# Patient Record
Sex: MALE | Race: WHITE
[De-identification: names, ages, dates, MRNs, and addresses within clinical notes are randomized per-mention and may not be internally consistent; named-entity substitution may affect disease eponyms.]

---

## 2018-06-19 ENCOUNTER — HOSPITAL ENCOUNTER (EMERGENCY)
Dept: HOSPITAL 63 - ER | Age: 23
Discharge: HOME | End: 2018-06-19
Payer: COMMERCIAL

## 2018-06-19 VITALS — DIASTOLIC BLOOD PRESSURE: 86 MMHG | SYSTOLIC BLOOD PRESSURE: 145 MMHG

## 2018-06-19 VITALS — BODY MASS INDEX: 25.11 KG/M2 | HEIGHT: 67 IN | WEIGHT: 160 LBS

## 2018-06-19 DIAGNOSIS — Z72.0: ICD-10-CM

## 2018-06-19 DIAGNOSIS — R04.2: Primary | ICD-10-CM

## 2018-06-19 PROCEDURE — 99284 EMERGENCY DEPT VISIT MOD MDM: CPT

## 2018-06-19 PROCEDURE — 71046 X-RAY EXAM CHEST 2 VIEWS: CPT

## 2018-06-19 NOTE — PHYS DOC
Past History


Past Medical History:  No Pertinent History


Past Surgical History:  No Surgical History


Alcohol Use:  Occasionally


Drug Use:  Marijuana





Adult General


Chief Complaint


Chief Complaint:  COUGH





HPI


HPI





Patient is a 23-year-old male who presents for evaluation after he noticed that 

he has been coughing up a small blood. He does smoke cigarettes. He denies any 

other complaints and specifically denies shortness of breath, chest pain, lower 

extremity pain or swelling, history of DVT or PE, weight loss or night sweats. 

He is alert and oriented 4, calm, appears to be in no distress this time. I 

did advise the patient at this time to stop smoking cigarettes. He denies any 

significant past medical history and takes no prescribed medications.





Review of Systems


Review of Systems





Constitutional: Denies fever or chills []


Eyes: Denies change in visual acuity, redness, or eye pain []


HENT: Denies nasal congestion or sore throat []


Respiratory:  [] +cough, some blood-tinged sputum


Cardiovascular: No additional information not addressed in HPI []


GI: Denies abdominal pain, nausea, vomiting, bloody stools or diarrhea []


: Denies dysuria or hematuria []


Musculoskeletal: Denies back pain or joint pain []


Integument: Denies rash or skin lesions []


Neurologic: Denies headache, focal weakness or sensory changes []


Endocrine: Denies polyuria or polydipsia []





All other systems were reviewed and found to be within normal limits, except as 

documented in this note.





Allergies


Allergies





Allergies








Coded Allergies Type Severity Reaction Last Updated Verified


 


  No Known Drug Allergies    18 No











Physical Exam


Physical Exam





Constitutional: Well developed, well nourished, no acute distress, non-toxic 

appearance. []


HENT: Normocephalic, atraumatic, bilateral external ears normal, oropharynx 

moist, no oral exudates, nose normal. []


Eyes: PERRLA, EOMI, conjunctiva normal, no discharge. [] 


Neck: Normal range of motion, no tenderness, supple, no stridor. [] 


Cardiovascular:Heart rate regular rhythm, no murmur []


Lungs & Thorax:  Bilateral breath sounds clear to auscultation []


Abdomen: Bowel sounds normal, soft, no tenderness, no masses, no pulsatile 

masses. [] 


Skin: Warm, dry, no erythema, no rash. [] 


Back: No tenderness, no CVA tenderness. [] 


Extremities: No tenderness, no cyanosis, no clubbing, ROM intact, no edema. [] 


Neurologic: Alert and oriented X 3, normal motor function, normal sensory 

function, no focal deficits noted. []


Psychologic: Affect normal, judgement normal, mood normal. []





Current Patient Data


Vital Signs





 Vital Signs








  Date Time  Temp Pulse Resp B/P (MAP) Pulse Ox O2 Delivery O2 Flow Rate FiO2


 


18 17:03 98.1 66 18  99 Room Air  











EKG


EKG


[]





Radiology/Procedures


Radiology/Procedures


 [facility] [address], [place] [address] [phone]


 


 IMAGING REPORT





 Signed





PATIENT: MARICARMEN MCCORMACK ACCOUNT: [id number] MRN#: [id number]


: 1995 LOCATION: ER AGE: 23


SEX: M EXAM DT: 18 ACCESSION#: [id number]


STATUS: REG ER ORD. PHYSICIAN: HARITHA ULLOA DO 


REASON: hemoptysis, cough


PROCEDURE: CHEST PA & LATERAL





 


PA and lateral chest radiographs 2018


 


Clinical History: Hemoptysis, congestion and cough.


 


Two PA and a lateral digital radiographs of the chest were obtained. No 


previous studies are available for comparison. The cardiac and mediastinal


silhouettes are within normal limits in size and configuration. No 


pulmonary infiltrate is seen. No pleural effusion or pneumothorax is 


noted. The osseous structures are grossly intact.


 


Impression: No radiographic evidence of active cardiopulmonary disease.


 


Electronically signed by: Samy Maher MD (2018 5:52 PM) [facility]














DICTATED AND SIGNED BY:     SAMY MAHER MD


DATE:     18 175





CC: HARITHA ULLOA DO; KARISSA NOLAN ~





Course & Med Decision Making


Course & Med Decision Making


Pertinent Labs and Imaging studies reviewed. (See chart for details)





@1755 - Patient updated on imaging results. Again advised patient to stop 

smoking. He'll go home with a prescription for prednisone.





Dragon Disclaimer


Dragon Disclaimer


This electronic medical record was generated, in whole or in part, using a 

voice recognition dictation system.





Departure


Departure:


Impression:  


 Primary Impression:  


 Cough with hemoptysis


 Additional Impression:  


 Tobacco abuse


Disposition:   HOME, SELF-CARE


Condition:  STABLE


Referrals:  


KARISSA NOLAN (PCP)


Patient Instructions:  Acute Bronchitis, Hemoptysis, Smoking Cessation, Smoking 

Hazards, Smoking, You Can Quit, Easy-to-Read





Additional Instructions:  


Patient needs to follow-up with his PCP in 2-3 days. Advised patient he needs 

to return to the hospital immediately if he is having new or worsening symptoms 

or coughs up dennise blood. Take the prescribed prednisone as directed.


Scripts


Prednisone (PREDNISONE) 20 Mg Tablet


2 TAB PO DAILY, #5 TAB


   Prov: HARITHA ULLOA DO         18





Problem Qualifiers











HARITHA ULLOA DO 2018 17:35

## 2018-06-19 NOTE — RAD
PA and lateral chest radiographs 6/19/2018

 

Clinical History: Hemoptysis, congestion and cough.

 

Two PA and a lateral digital radiographs of the chest were obtained. No 

previous studies are available for comparison. The cardiac and mediastinal

silhouettes are within normal limits in size and configuration. No 

pulmonary infiltrate is seen. No pleural effusion or pneumothorax is 

noted. The osseous structures are grossly intact.

 

Impression: No radiographic evidence of active cardiopulmonary disease.

 

Electronically signed by: Samy Maher MD (6/19/2018 5:52 PM) [facility]

## 2022-01-05 ENCOUNTER — HOSPITAL ENCOUNTER (EMERGENCY)
Dept: HOSPITAL 63 - ER | Age: 27
Discharge: HOME | End: 2022-01-05
Payer: COMMERCIAL

## 2022-01-05 VITALS — BODY MASS INDEX: 23.6 KG/M2 | HEIGHT: 67 IN | WEIGHT: 150.36 LBS

## 2022-01-05 VITALS — DIASTOLIC BLOOD PRESSURE: 68 MMHG | SYSTOLIC BLOOD PRESSURE: 134 MMHG
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
## 2022-01-05 DIAGNOSIS — S61.412A: Primary | ICD-10-CM
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
## 2022-01-05 PROCEDURE — 90715 TDAP VACCINE 7 YRS/> IM: CPT

## 2022-01-05 PROCEDURE — 12002 RPR S/N/AX/GEN/TRNK2.6-7.5CM: CPT

## 2022-01-05 PROCEDURE — 90471 IMMUNIZATION ADMIN: CPT

## 2022-01-05 PROCEDURE — 99283 EMERGENCY DEPT VISIT LOW MDM: CPT

## 2022-01-05 NOTE — PHYS DOC
Past History


Past Medical History:  No Pertinent History


 (CHIOMA ASCENCIO)


Past Surgical History:  No Surgical History


 (CHIOMA ASCENCIO)


Alcohol Use:  Occasionally


Drug Use:  Marijuana


 (CHIOMA ASCENCIO)





General Adult


EDM:


Chief Complaint:  LACERATION/AVULSION





HPI:


HPI:





Patient is a 26 year old male who presents with laceration to his left hand.  

Patient reports he was working with a  when the  slipped, 

lacerating the finger space between digits 1 and 2 on the left hand.  Patient 

reports significant amount of bleeding at the time of drain.  He immediately got

a towel and wrapped the wound.  Patient reports some associated paresthesias to 

the distal end of digit 2.  Tetanus vaccination is not up-to-date.  He has no 

other injuries or complaints at this time.


 (CHIOMA ASCENCIO)





Review of Systems:


Review of Systems:


ROS negative or noncontributory except as mentioned in HPI.


 (CHIOMA ASCENCIO)





Current Medications:


Current Meds:





Current Medications








 Medications


  (Trade)  Dose


 Ordered  Sig/Abhijeet  Start Time


 Stop Time Status Last Admin


Dose Admin


 


 Acetaminophen


  (Tylenol)  1,000 mg  1X  ONCE  1/5/22 18:00


 1/5/22 18:01 DC 1/5/22 18:00


1,000 MG


 


 Diphtheria/


 Tetanus/Acell


 Pertussis


  (Boostrix)  0.5 ml  ONCE ONCE  1/5/22 17:00


 1/5/22 17:18 DC 1/5/22 17:47


0.5 ML


 


 Lidocaine HCl  20 ml  1X  ONCE  1/5/22 17:00


 1/5/22 17:18 DC 1/5/22 18:56


20 ML








 (CHIOMA ASCENCIO)





Allergies:


Allergies:





Allergies








Coded Allergies Type Severity Reaction Last Updated Verified


 


  No Known Drug Allergies    1/5/22 No








 (CHIOMA ASCENCIO)





Physical Exam:


PE:





Constitutional: Well developed, well nourished, no acute distress, non-toxic 

appearance. 


Cardiovascular: Heart rate regular rhythm, no murmur.


Lungs & Thorax: Bilateral breath sounds clear to auscultation.


Skin: Wide 3 cm x 3 cm V-shaped laceration to the lateral aspect of the left 

hand on the palmar side between digits 1 and 2.  Skin otherwise warm, dry, no 

erythema, no rash.


Extremities: No tenderness, no cyanosis, no clubbing, ROM intact, no edema. 


 (CHIOMA ASCENCIO)





Current Patient Data:


Vital Signs:





                                   Vital Signs








  Date Time  Temp Pulse Resp B/P (MAP) Pulse Ox O2 Delivery O2 Flow Rate FiO2


 


1/5/22 19:09  76 18 134/68 (90) 96   


 


1/5/22 16:30 99.7 99 16 146/88 (107) 98   








 (CHIOMA ASCENCIO)





Heart Score:


C/O Chest Pain:  No


 (CHIOMA ASCENCIO)





Course & Med Decision Making:


Course & Med Decision Making


Pertinent Labs and Imaging studies reviewed. (See chart for details)





Patient is a 26-year-old male who presents with an isolated injury to his left 

hand.  Patient cut his hand with a .  Tetanus will be updated in the d

epartment.  Wound will be closed with sutures.





See laceration closure procedure note as noted below.  Patient tolerated procedu

re well.  He is given return precautions and wound care instructions.  Patient 

understands and is agreeable to discharge plan


 (CHIOMA ASCENCIO)





Dragon Disclaimer:


Dragon Disclaimer:


This electronic medical record was generated, in whole or in part, using a voice

recognition dictation system.


 (CHIOMA ASCENCIO)





Laceration Repair


Lac Repair


Indication: Laceration to lateral aspect of left palm





Procedure: The patient was placed in the appropriate position and anesthesia 

around the laceration 4 cc 1% lidocaine plain. The area was then irrigated with 

800 cc sterile saline and cleansed with Betadine solution. The laceration was 

closed with 8 simple interrupted 5-0 nylon sutures.  The wound area was then 

dressed with Xeroform and gauze.  





Total repaired wound length: 7-8 cm. 





Other Items: The patient tolerated the procedure very well.





Complications: No complications.


 (CHIOMA ASCENCIO)





Departure


Departure:


Impression:  


   Primary Impression:  


   Laceration of left palm without complication


   Qualified Codes:  S61.412A - Laceration without foreign body of left hand, 

   initial encounter


Disposition:  01 HOME / SELF CARE / HOMELESS


Condition:  STABLE


Referrals:  


KARISSA NOLAN (PCP)


Patient Instructions:  Sutured Wound Care, Easy-to-Read





Additional Instructions:  


EMERGENCY DEPARTMENT GENERAL DISCHARGE INSTRUCTIONS


Thank you for coming to [facility] (ED) today and trusting us

with you care.  We trust that you had a positive experience in our Emergency 

Department.  If you wish to speak to the department management, you may call the

director at [phone].





YOUR FOLLOW UP INSTRUCTIONS ARE AS FOLLOWS:


1.  Do you have a private Doctor?  If you do not have a private doctor, please 

ask for a resource list of physicians or clinics that may be able to assist you 

with follow up care.


2.  The Emergency Physician has interpreted your x-rays.  The X-Ray specialist 

will also review them.  If there is a change in the findings, you will be 

notified in 48 hours when at all possible.


3.  A lab test or culture has been done, your results will be reviewed and you 

will be notified if you need a change in treatment.





ADDITIONAL INSTRUCTIONS AND INFORMATION:


1.  Your care today has been supervised by a physician who is specially trained 

in emergency care.  Many problems require more than one evaluation for a 

complete diagnosis and treatment.  We recommend that you schedule your follow up

appointment as recommended to ensure complete treatment of you illness or 

injury.  If you are unable to obtain follow up care and continue to have a 

problem, or if your condition worsens, we recommend that you return to the ED.


2.  We are not able to safely determine your condition over the phone nor are we

able to give sound medical advice over the phone.  For these safety reasons, if 

you call for medical advice we will ask you to come to the ED for further 

evaluation.


3.  If you have any questions regarding these discharge instructions please call

the ED at [phone].





SAFETY INFORMATION:


In the interest of safety, wellness, and injury prevention; we encourage you to 

wear your seat belt, if you smoke; quite smoking, and we encourage family to use

a protective helmet for bicycling and other sporting events that present an 

increased risk for head injury.





IF YOUR SYMPTOMS WORSEN OR NEW SYMPTOMS DEVELOP, OR YOU HAVE CONCERNS ABOUT YOUR

CONDITION; OR IF YOUR CONDITION WORSENS WHILE YOU ARE WAITING FOR YOUR FOLLOW UP

APPOINTMENT; EITHER CONTACT YOUR PRIMARY CARE DOCTOR, THE PHYSICIAN WHOSE NAME 

AND NUMBER YOU WERE GIVEN, OR RETURN TO THE ED IMMEDIATELY.





Attending Signature


Attending Signature


I have participated in the care of this patient and I have reviewed and agree 

with all pertinent clinical information above including history, exam, and 

recommendations.





 (CHARLES LIVE MD)











CHIOMA ASCENCIO               Jan 5, 2022 19:11


CHARLES LIVE MD            Jan 6, 2022 18:35